# Patient Record
Sex: MALE | Race: WHITE | NOT HISPANIC OR LATINO | Employment: UNEMPLOYED | URBAN - METROPOLITAN AREA
[De-identification: names, ages, dates, MRNs, and addresses within clinical notes are randomized per-mention and may not be internally consistent; named-entity substitution may affect disease eponyms.]

---
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
## 2022-05-25 PROCEDURE — 99284 EMERGENCY DEPT VISIT MOD MDM: CPT | Mod: 25

## 2022-05-26 ENCOUNTER — APPOINTMENT (OUTPATIENT)
Dept: CT IMAGING | Facility: CLINIC | Age: 22
End: 2022-05-26
Payer: COMMERCIAL

## 2022-05-26 ENCOUNTER — HOSPITAL ENCOUNTER (EMERGENCY)
Facility: CLINIC | Age: 22
Discharge: HOME | End: 2022-05-26
Attending: EMERGENCY MEDICINE | Admitting: EMERGENCY MEDICINE
Payer: COMMERCIAL

## 2022-05-26 VITALS
HEART RATE: 48 BPM | DIASTOLIC BLOOD PRESSURE: 79 MMHG | TEMPERATURE: 97.1 F | SYSTOLIC BLOOD PRESSURE: 125 MMHG | RESPIRATION RATE: 16 BRPM | OXYGEN SATURATION: 100 %
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
## 2022-05-26 DIAGNOSIS — S02.31XA CLOSED BLOW-OUT FRACTURE OF RIGHT ORBIT, INITIAL ENCOUNTER (H): Primary | ICD-10-CM

## 2022-05-26 DIAGNOSIS — S09.90XA CLOSED HEAD INJURY, INITIAL ENCOUNTER: ICD-10-CM

## 2022-05-26 DIAGNOSIS — R93.0 ABNORMAL HEAD CT: ICD-10-CM

## 2022-05-26 PROCEDURE — 70486 CT MAXILLOFACIAL W/O DYE: CPT

## 2022-05-26 PROCEDURE — 250N000011 HC RX IP 250 OP 636

## 2022-05-26 PROCEDURE — 70450 CT HEAD/BRAIN W/O DYE: CPT

## 2022-05-26 PROCEDURE — 250N000013 HC RX MED GY IP 250 OP 250 PS 637

## 2022-05-26 RX ORDER — ONDANSETRON 4 MG/1
4 TABLET, ORALLY DISINTEGRATING ORAL ONCE
Status: COMPLETED | OUTPATIENT
Start: 2022-05-26 | End: 2022-05-26

## 2022-05-26 RX ORDER — ACETAMINOPHEN 325 MG/1
650 TABLET ORAL ONCE
Status: COMPLETED | OUTPATIENT
Start: 2022-05-26 | End: 2022-05-26

## 2022-05-26 RX ADMIN — ACETAMINOPHEN 650 MG: 325 TABLET, FILM COATED ORAL at 04:04

## 2022-05-26 RX ADMIN — ONDANSETRON 4 MG: 4 TABLET, ORALLY DISINTEGRATING ORAL at 03:40

## 2022-05-26 ASSESSMENT — ENCOUNTER SYMPTOMS
ARTHRALGIAS: 0
NECK PAIN: 0
WOUND: 1
HEADACHES: 1
COLOR CHANGE: 1
VOMITING: 1

## 2022-05-26 ASSESSMENT — VISUAL ACUITY
OS: 20/20
OD: 20/20
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
